# Patient Record
(demographics unavailable — no encounter records)

---

## 2017-02-01 DIAGNOSIS — Z30.49 ENCOUNTER FOR SURVEILLANCE OF OTHER CONTRACEPTIVE: Primary | ICD-10-CM

## 2017-02-01 RX ORDER — MEDROXYPROGESTERONE ACETATE 150 MG/ML
150 INJECTION, SUSPENSION INTRAMUSCULAR
Qty: 1 VIAL | Refills: 1 | Status: CANCELLED | OUTPATIENT
Start: 2017-02-01 | End: 2017-05-02

## 2017-02-01 NOTE — TELEPHONE ENCOUNTER
Pt called stating that she is due for her Depo injection, but she is down [address] in [facility] for school.  She states that the school would be willing to give it to her if she had the script sent to a local pharmacy and if our office would send medical davis

## 2017-02-02 NOTE — TELEPHONE ENCOUNTER
No future appointments.     LOV    LAST LAB    LAST RX    PROTOCOL  Gynecology Medication Protocol Failed2/2 9:57 AM   PASS-PENDING LAST PAP WNL--VIA MANUAL LOOKUP

## 2017-06-15 PROCEDURE — 87591 N.GONORRHOEAE DNA AMP PROB: CPT | Performed by: FAMILY MEDICINE

## 2017-06-15 PROCEDURE — 87389 HIV-1 AG W/HIV-1&-2 AB AG IA: CPT | Performed by: FAMILY MEDICINE

## 2017-06-15 PROCEDURE — 87491 CHLMYD TRACH DNA AMP PROBE: CPT | Performed by: FAMILY MEDICINE

## 2017-06-15 PROCEDURE — 36415 COLL VENOUS BLD VENIPUNCTURE: CPT | Performed by: FAMILY MEDICINE

## 2017-06-15 PROCEDURE — 86780 TREPONEMA PALLIDUM: CPT | Performed by: FAMILY MEDICINE

## 2017-06-15 PROCEDURE — 80074 ACUTE HEPATITIS PANEL: CPT | Performed by: FAMILY MEDICINE

## 2017-07-24 PROCEDURE — 87491 CHLMYD TRACH DNA AMP PROBE: CPT | Performed by: FAMILY MEDICINE

## 2017-07-24 PROCEDURE — 87660 TRICHOMONAS VAGIN DIR PROBE: CPT | Performed by: FAMILY MEDICINE

## 2017-07-24 PROCEDURE — 87591 N.GONORRHOEAE DNA AMP PROB: CPT | Performed by: FAMILY MEDICINE

## 2017-07-24 PROCEDURE — 87510 GARDNER VAG DNA DIR PROBE: CPT | Performed by: FAMILY MEDICINE

## 2017-07-24 PROCEDURE — 87480 CANDIDA DNA DIR PROBE: CPT | Performed by: FAMILY MEDICINE

## 2018-12-28 PROCEDURE — 88175 CYTOPATH C/V AUTO FLUID REDO: CPT | Performed by: FAMILY MEDICINE
